# Patient Record
Sex: MALE | Race: BLACK OR AFRICAN AMERICAN | NOT HISPANIC OR LATINO | Employment: OTHER | URBAN - NONMETROPOLITAN AREA
[De-identification: names, ages, dates, MRNs, and addresses within clinical notes are randomized per-mention and may not be internally consistent; named-entity substitution may affect disease eponyms.]

---

## 2021-06-18 ENCOUNTER — HOSPITAL ENCOUNTER (EMERGENCY)
Facility: HOSPITAL | Age: 56
Discharge: HOME | End: 2021-06-18
Attending: EMERGENCY MEDICINE
Payer: MEDICARE

## 2021-06-18 VITALS
SYSTOLIC BLOOD PRESSURE: 201 MMHG | OXYGEN SATURATION: 100 % | BODY MASS INDEX: 18.42 KG/M2 | TEMPERATURE: 98 F | WEIGHT: 139 LBS | RESPIRATION RATE: 14 BRPM | HEART RATE: 95 BPM | HEIGHT: 73 IN | DIASTOLIC BLOOD PRESSURE: 95 MMHG

## 2021-06-18 DIAGNOSIS — M79.89 LEG SWELLING: Primary | ICD-10-CM

## 2021-06-18 DIAGNOSIS — R60.9 SWELLING: ICD-10-CM

## 2021-06-18 PROCEDURE — 25000003 PHARM REV CODE 250: Performed by: EMERGENCY MEDICINE

## 2021-06-18 PROCEDURE — 99284 EMERGENCY DEPT VISIT MOD MDM: CPT | Mod: 25

## 2021-06-18 RX ORDER — AMLODIPINE BESYLATE 10 MG/1
10 TABLET ORAL
Status: COMPLETED | OUTPATIENT
Start: 2021-06-18 | End: 2021-06-18

## 2021-06-18 RX ORDER — HYDROCODONE BITARTRATE AND ACETAMINOPHEN 5; 325 MG/1; MG/1
1 TABLET ORAL EVERY 6 HOURS PRN
Qty: 11 TABLET | Refills: 0 | Status: ON HOLD | OUTPATIENT
Start: 2021-06-18 | End: 2023-07-31 | Stop reason: HOSPADM

## 2021-06-18 RX ORDER — SULFAMETHOXAZOLE AND TRIMETHOPRIM 800; 160 MG/1; MG/1
1 TABLET ORAL 2 TIMES DAILY
Qty: 14 TABLET | Refills: 0 | Status: SHIPPED | OUTPATIENT
Start: 2021-06-18 | End: 2021-06-25

## 2021-06-18 RX ADMIN — AMLODIPINE BESYLATE 10 MG: 10 TABLET ORAL at 07:06

## 2021-06-25 ENCOUNTER — HOSPITAL ENCOUNTER (EMERGENCY)
Facility: HOSPITAL | Age: 56
Discharge: LEFT BEFORE DISCHARGE | End: 2021-06-25
Attending: FAMILY MEDICINE
Payer: MEDICARE

## 2021-06-25 VITALS
OXYGEN SATURATION: 99 % | HEART RATE: 109 BPM | SYSTOLIC BLOOD PRESSURE: 113 MMHG | TEMPERATURE: 98 F | DIASTOLIC BLOOD PRESSURE: 72 MMHG | WEIGHT: 135 LBS | BODY MASS INDEX: 17.32 KG/M2 | HEIGHT: 74 IN | RESPIRATION RATE: 18 BRPM

## 2021-06-25 DIAGNOSIS — R22.41 LOCALIZED SWELLING OF RIGHT LOWER EXTREMITY: ICD-10-CM

## 2021-06-25 DIAGNOSIS — M79.89 LEG SWELLING: Primary | ICD-10-CM

## 2021-06-25 PROCEDURE — 99282 EMERGENCY DEPT VISIT SF MDM: CPT

## 2023-07-29 ENCOUNTER — HOSPITAL ENCOUNTER (EMERGENCY)
Facility: HOSPITAL | Age: 58
Discharge: ANOTHER HOSPITAL | End: 2023-07-30
Attending: STUDENT IN AN ORGANIZED HEALTH CARE EDUCATION/TRAINING PROGRAM
Payer: MEDICARE

## 2023-07-29 DIAGNOSIS — E87.1 HYPONATREMIA: ICD-10-CM
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
## 2023-07-29 DIAGNOSIS — S72.90XA FEMUR FRACTURE: Primary | ICD-10-CM

## 2023-07-29 LAB
ABO + RH BLD: NORMAL
ALBUMIN SERPL BCP-MCNC: 4 G/DL (ref 3.5–5.2)
ALP SERPL-CCNC: 75 U/L (ref 55–135)
ALT SERPL W/O P-5'-P-CCNC: 43 U/L (ref 10–44)
ANION GAP SERPL CALC-SCNC: 7 MMOL/L (ref 8–16)
APTT PPP: 27.8 SEC (ref 21–32)
AST SERPL-CCNC: 39 U/L (ref 10–40)
BASOPHILS # BLD AUTO: 0.06 K/UL (ref 0–0.2)
BASOPHILS NFR BLD: 1.2 % (ref 0–1.9)
BILIRUB SERPL-MCNC: 0.3 MG/DL (ref 0.1–1)
BLD GP AB SCN CELLS X3 SERPL QL: NORMAL
BUN SERPL-MCNC: 9 MG/DL (ref 6–20)
CALCIUM SERPL-MCNC: 8.6 MG/DL (ref 8.7–10.5)
CHLORIDE SERPL-SCNC: 87 MMOL/L (ref 95–110)
CO2 SERPL-SCNC: 29 MMOL/L (ref 23–29)
CREAT SERPL-MCNC: 0.8 MG/DL (ref 0.5–1.4)
DIFFERENTIAL METHOD: ABNORMAL
EOSINOPHIL # BLD AUTO: 0.2 K/UL (ref 0–0.5)
EOSINOPHIL NFR BLD: 3.3 % (ref 0–8)
ERYTHROCYTE [DISTWIDTH] IN BLOOD BY AUTOMATED COUNT: 13.1 % (ref 11.5–14.5)
EST. GFR  (NO RACE VARIABLE): >60 ML/MIN/1.73 M^2
GLUCOSE SERPL-MCNC: 95 MG/DL (ref 70–110)
HCT VFR BLD AUTO: 35.5 % (ref 40–54)
HGB BLD-MCNC: 12.7 G/DL (ref 14–18)
IMM GRANULOCYTES # BLD AUTO: 0.01 K/UL (ref 0–0.04)
IMM GRANULOCYTES NFR BLD AUTO: 0.2 % (ref 0–0.5)
INR PPP: 1 (ref 0.8–1.2)
LYMPHOCYTES # BLD AUTO: 2.4 K/UL (ref 1–4.8)
LYMPHOCYTES NFR BLD: 49 % (ref 18–48)
MCH RBC QN AUTO: 32.6 PG (ref 27–31)
MCHC RBC AUTO-ENTMCNC: 35.8 G/DL (ref 32–36)
MCV RBC AUTO: 91 FL (ref 82–98)
MONOCYTES # BLD AUTO: 0.5 K/UL (ref 0.3–1)
MONOCYTES NFR BLD: 10.4 % (ref 4–15)
NEUTROPHILS # BLD AUTO: 1.8 K/UL (ref 1.8–7.7)
NEUTROPHILS NFR BLD: 35.9 % (ref 38–73)
NRBC BLD-RTO: 0 /100 WBC
PLATELET # BLD AUTO: 191 K/UL (ref 150–450)
PMV BLD AUTO: 9.2 FL (ref 9.2–12.9)
POTASSIUM SERPL-SCNC: 3.1 MMOL/L (ref 3.5–5.1)
PROT SERPL-MCNC: 7.7 G/DL (ref 6–8.4)
PROTHROMBIN TIME: 10.1 SEC (ref 9–12.5)
RBC # BLD AUTO: 3.89 M/UL (ref 4.6–6.2)
SODIUM SERPL-SCNC: 123 MMOL/L (ref 136–145)
SPECIMEN OUTDATE: NORMAL
WBC # BLD AUTO: 4.92 K/UL (ref 3.9–12.7)

## 2023-07-29 PROCEDURE — 80053 COMPREHEN METABOLIC PANEL: CPT | Performed by: STUDENT IN AN ORGANIZED HEALTH CARE EDUCATION/TRAINING PROGRAM

## 2023-07-29 PROCEDURE — 85610 PROTHROMBIN TIME: CPT | Performed by: STUDENT IN AN ORGANIZED HEALTH CARE EDUCATION/TRAINING PROGRAM

## 2023-07-29 PROCEDURE — 86900 BLOOD TYPING SEROLOGIC ABO: CPT | Performed by: STUDENT IN AN ORGANIZED HEALTH CARE EDUCATION/TRAINING PROGRAM

## 2023-07-29 PROCEDURE — 85730 THROMBOPLASTIN TIME PARTIAL: CPT | Performed by: STUDENT IN AN ORGANIZED HEALTH CARE EDUCATION/TRAINING PROGRAM

## 2023-07-29 PROCEDURE — 85025 COMPLETE CBC W/AUTO DIFF WBC: CPT | Performed by: STUDENT IN AN ORGANIZED HEALTH CARE EDUCATION/TRAINING PROGRAM

## 2023-07-29 PROCEDURE — 99285 EMERGENCY DEPT VISIT HI MDM: CPT | Mod: 25

## 2023-07-29 PROCEDURE — 93010 EKG 12-LEAD: ICD-10-PCS | Mod: ,,, | Performed by: INTERNAL MEDICINE

## 2023-07-29 PROCEDURE — 36415 COLL VENOUS BLD VENIPUNCTURE: CPT | Performed by: STUDENT IN AN ORGANIZED HEALTH CARE EDUCATION/TRAINING PROGRAM

## 2023-07-29 PROCEDURE — 96374 THER/PROPH/DIAG INJ IV PUSH: CPT

## 2023-07-29 PROCEDURE — 93010 ELECTROCARDIOGRAM REPORT: CPT | Mod: ,,, | Performed by: INTERNAL MEDICINE

## 2023-07-29 PROCEDURE — 93005 ELECTROCARDIOGRAM TRACING: CPT

## 2023-07-29 RX ORDER — HYDROMORPHONE HYDROCHLORIDE 1 MG/ML
1 INJECTION, SOLUTION INTRAMUSCULAR; INTRAVENOUS; SUBCUTANEOUS
Status: COMPLETED | OUTPATIENT
Start: 2023-07-30 | End: 2023-07-29

## 2023-07-29 RX ADMIN — HYDROMORPHONE HYDROCHLORIDE 1 MG: 1 INJECTION, SOLUTION INTRAMUSCULAR; INTRAVENOUS; SUBCUTANEOUS at 11:07

## 2023-07-30 VITALS
RESPIRATION RATE: 18 BRPM | TEMPERATURE: 98 F | HEART RATE: 74 BPM | SYSTOLIC BLOOD PRESSURE: 169 MMHG | HEIGHT: 74 IN | BODY MASS INDEX: 17.97 KG/M2 | DIASTOLIC BLOOD PRESSURE: 82 MMHG | OXYGEN SATURATION: 98 % | WEIGHT: 140 LBS

## 2023-07-30 PROBLEM — E87.1 HYPONATREMIA: Status: ACTIVE | Noted: 2023-07-30

## 2023-07-30 PROBLEM — S72.402A CLOSED FRACTURE OF DISTAL END OF LEFT FEMUR: Status: RESOLVED | Noted: 2023-07-30 | Resolved: 2023-07-30

## 2023-07-30 PROBLEM — I10 HTN (HYPERTENSION): Status: ACTIVE | Noted: 2023-07-30

## 2023-07-30 PROBLEM — S72.402A CLOSED FRACTURE OF DISTAL END OF LEFT FEMUR: Status: ACTIVE | Noted: 2023-07-30

## 2023-07-30 PROBLEM — S72.401A CLOSED FRACTURE OF RIGHT DISTAL FEMUR: Status: ACTIVE | Noted: 2023-07-30

## 2023-07-30 PROBLEM — F10.10 ALCOHOL ABUSE, DAILY USE: Status: ACTIVE | Noted: 2023-07-30

## 2023-07-30 LAB
BACTERIA #/AREA URNS HPF: NEGATIVE /HPF
BILIRUB UR QL STRIP: NEGATIVE
CLARITY UR: CLEAR
COLOR UR: YELLOW
CREAT UR-MCNC: 71.7 MG/DL (ref 23–375)
GLUCOSE UR QL STRIP: NEGATIVE
HGB UR QL STRIP: ABNORMAL
HYALINE CASTS #/AREA URNS LPF: 0 /LPF
KETONES UR QL STRIP: NEGATIVE
LEUKOCYTE ESTERASE UR QL STRIP: NEGATIVE
MICROSCOPIC COMMENT: ABNORMAL
NITRITE UR QL STRIP: NEGATIVE
OSMOLALITY UR: 355 MOSM/KG (ref 50–1200)
PH UR STRIP: 7 [PH] (ref 5–8)
POTASSIUM UR-SCNC: 33 MMOL/L (ref 15–95)
PROT UR QL STRIP: NEGATIVE
RBC #/AREA URNS HPF: 7 /HPF (ref 0–4)
SODIUM UR-SCNC: 48 MMOL/L (ref 20–250)
SP GR UR STRIP: 1.01 (ref 1–1.03)
SQUAMOUS #/AREA URNS HPF: 0 /HPF
URN SPEC COLLECT METH UR: ABNORMAL
UROBILINOGEN UR STRIP-ACNC: NEGATIVE EU/DL
WBC #/AREA URNS HPF: 0 /HPF (ref 0–5)

## 2023-07-30 PROCEDURE — 82570 ASSAY OF URINE CREATININE: CPT | Performed by: STUDENT IN AN ORGANIZED HEALTH CARE EDUCATION/TRAINING PROGRAM

## 2023-07-30 PROCEDURE — 83935 ASSAY OF URINE OSMOLALITY: CPT | Performed by: STUDENT IN AN ORGANIZED HEALTH CARE EDUCATION/TRAINING PROGRAM

## 2023-07-30 PROCEDURE — 84133 ASSAY OF URINE POTASSIUM: CPT | Performed by: STUDENT IN AN ORGANIZED HEALTH CARE EDUCATION/TRAINING PROGRAM

## 2023-07-30 PROCEDURE — 63600175 PHARM REV CODE 636 W HCPCS: Performed by: STUDENT IN AN ORGANIZED HEALTH CARE EDUCATION/TRAINING PROGRAM

## 2023-07-30 PROCEDURE — 84300 ASSAY OF URINE SODIUM: CPT | Performed by: STUDENT IN AN ORGANIZED HEALTH CARE EDUCATION/TRAINING PROGRAM

## 2023-07-30 PROCEDURE — 81000 URINALYSIS NONAUTO W/SCOPE: CPT | Performed by: STUDENT IN AN ORGANIZED HEALTH CARE EDUCATION/TRAINING PROGRAM

## 2023-07-30 NOTE — ED PROVIDER NOTES
"  History  Chief Complaint   Patient presents with    Knee Injury     Patient presents to ED via EMS c/o right knee pain. Patient tripped over fan and "felt pop". Obvious swelling on assessment.     58-YEAR-OLD MALE PRESENTS FOR EVALUATION STATUS POST A TRIP AND FALL.  PATIENT HAD MECHANICAL TRIP AND FALL NOW WITH RIGHT PAIN AND SWELLING.  NO HEAD TRAUMA REPORTED.  NO LOSS OF CONSCIOUSNESS REPORTED.  ALL OTHER SYSTEMS REVIEWED ARE NEGATIVE.        Past Medical History:   Diagnosis Date    Hypertension        No past surgical history on file.    No family history on file.    Social History     Tobacco Use    Smoking status: Every Day     Current packs/day: 1.00     Average packs/day: 1 pack/day for 20.0 years (20.0 ttl pk-yrs)     Types: Cigarettes    Smokeless tobacco: Never   Substance Use Topics    Alcohol use: Yes     Comment: every other day beer    Drug use: No       ROS  Review of Systems   Musculoskeletal:  Positive for arthralgias and joint swelling.       Physical Exam  BP (!) 169/82   Pulse 74   Temp 98.2 °F (36.8 °C)   Resp 18   Ht 6' 2" (1.88 m)   Wt 63.5 kg (140 lb)   SpO2 98%   BMI 17.97 kg/m²   Physical Exam    Constitutional: He appears well-developed and well-nourished. He is cooperative.   HENT:   Head: Normocephalic and atraumatic.   Eyes: Conjunctivae, EOM and lids are normal. Pupils are equal, round, and reactive to light.   Neck: Phonation normal.   Normal range of motion.  Cardiovascular:  Normal rate, regular rhythm and intact distal pulses.           Pulmonary/Chest: Breath sounds normal. No stridor. No respiratory distress.   Abdominal: Abdomen is soft. There is no abdominal tenderness.   Musculoskeletal:      Cervical back: Normal range of motion.      Right knee: Swelling, effusion and bony tenderness present.     Neurological: He is alert and oriented to person, place, and time.   Skin: Skin is warm and dry.   Psychiatric: He has a normal mood and affect. His speech is normal and " behavior is normal.               Labs Reviewed   CBC W/ AUTO DIFFERENTIAL - Abnormal; Notable for the following components:       Result Value    RBC 3.89 (*)     Hemoglobin 12.7 (*)     Hematocrit 35.5 (*)     MCH 32.6 (*)     Gran % 35.9 (*)     Lymph % 49.0 (*)     All other components within normal limits   COMPREHENSIVE METABOLIC PANEL - Abnormal; Notable for the following components:    Sodium 123 (*)     Potassium 3.1 (*)     Chloride 87 (*)     Calcium 8.6 (*)     Anion Gap 7 (*)     All other components within normal limits   URINALYSIS, REFLEX TO URINE CULTURE - Abnormal; Notable for the following components:    Occult Blood UA 1+ (*)     All other components within normal limits    Narrative:     Preferred Collection Type->Urine, Clean Catch  Specimen Source->Urine   URINALYSIS MICROSCOPIC - Abnormal; Notable for the following components:    RBC, UA 7 (*)     Hyaline Casts, UA 0.0 (*)     All other components within normal limits    Narrative:     Preferred Collection Type->Urine, Clean Catch  Specimen Source->Urine   APTT   PROTIME-INR   SODIUM, URINE, RANDOM    Narrative:     Preferred Collection Type->Urine, Clean Catch  Specimen Source->Urine   CREATININE, URINE, RANDOM    Narrative:     Preferred Collection Type->Urine, Clean Catch  Specimen Source->Urine   POTASSIUM, URINE, RANDOM    Narrative:     Preferred Collection Type->Urine, Clean Catch  Specimen Source->Urine   OSMOLALITY, URINE RANDOM    Narrative:     Preferred Collection Type->Urine, Clean Catch  Specimen Source->Urine   TYPE & SCREEN        Type of Interpretation: ED Physician (Independently Interpreted).  Radiology Procedure Done: Right Femur.  Interpretation: DISTAL FEMUR FX                      Procedures             Medical Decision Making  58-YEAR-OLD MALE PRESENTS FOR EVALUATION STATUS POST A TRIP AND FALL.  PATIENT HAD MECHANICAL TRIP AND FALL NOW WITH RIGHT PAIN AND SWELLING.  NO HEAD TRAUMA REPORTED.  NO LOSS OF CONSCIOUSNESS  REPORTED.  ALL OTHER SYSTEMS REVIEWED ARE NEGATIVE.  X-RAY NOTABLE FOR DISTAL FEMUR FRACTURE.  PREOP LABS OBTAINED AND [facility] PLACED FOR TRANSFER.    Amount and/or Complexity of Data Reviewed  Labs: ordered. Decision-making details documented in ED Course.  Radiology: ordered.    Risk  Prescription drug management.               ED Course as of 07/31/23 0236   Sat Jul 29, 2023   2327 Pt accepted to [facility] [NA]   2327 Sodium(!): 123 [NA]      ED Course User Index  [NA] Andreina Andrews MD       Clinical Impression  The primary encounter diagnosis was Femur fracture. Diagnoses of Fall and Hyponatremia were also pertinent to this visit.       Andreina Andrews MD  07/31/23 0236

## 2023-07-31 PROBLEM — I10 HTN (HYPERTENSION): Chronic | Status: ACTIVE | Noted: 2023-07-30

## 2023-08-08 ENCOUNTER — HOSPITAL ENCOUNTER (OUTPATIENT)
Dept: RADIOLOGY | Facility: HOSPITAL | Age: 58
Discharge: HOME | End: 2023-08-08
Payer: MEDICARE

## 2023-08-08 DIAGNOSIS — H53.40 VISUAL FIELD DEFECT: ICD-10-CM

## 2023-08-08 DIAGNOSIS — S09.90XA HEAD TRAUMA, INITIAL ENCOUNTER: ICD-10-CM

## 2023-08-08 PROBLEM — R47.02 DYSPHASIA: Status: ACTIVE | Noted: 2023-08-08

## 2023-08-08 PROCEDURE — 70450 CT HEAD/BRAIN W/O DYE: CPT | Mod: TC

## 2023-08-14 ENCOUNTER — HOSPITAL ENCOUNTER (OUTPATIENT)
Dept: RADIOLOGY | Facility: HOSPITAL | Age: 58
Discharge: HOME | End: 2023-08-14
Payer: MEDICARE

## 2023-08-14 DIAGNOSIS — I63.89 OTHER CEREBRAL INFARCTION: ICD-10-CM

## 2023-08-14 DIAGNOSIS — I63.9 CEREBROVASCULAR ACCIDENT (CVA), UNSPECIFIED MECHANISM: ICD-10-CM

## 2023-08-14 PROCEDURE — 70551 MRI BRAIN STEM W/O DYE: CPT | Mod: TC

## 2023-08-14 PROCEDURE — 70544 MR ANGIOGRAPHY HEAD W/O DYE: CPT | Mod: TC

## 2023-08-16 NOTE — PROGRESS NOTES
Please let patient's sister Nadia know MRI does confirm stroke.  Please stress importance of keeping scheduled follow-up 8/21 to further discuss.

## 2023-08-23 ENCOUNTER — HOSPITAL ENCOUNTER (OUTPATIENT)
Dept: RADIOLOGY | Facility: HOSPITAL | Age: 58
Discharge: HOME | End: 2023-08-23
Payer: MEDICARE

## 2023-08-23 DIAGNOSIS — I63.9 CEREBROVASCULAR ACCIDENT (CVA), UNSPECIFIED MECHANISM: ICD-10-CM

## 2023-08-23 DIAGNOSIS — I63.89 OTHER CEREBRAL INFARCTION: ICD-10-CM

## 2023-08-23 PROCEDURE — 93880 EXTRACRANIAL BILAT STUDY: CPT | Mod: TC

## 2023-08-23 NOTE — PROGRESS NOTES
Please let patient know US was normal.  Please stress to patient he needs to schedule follow-up appointment with us to further discuss treatment.

## 2023-09-25 PROBLEM — R63.0 DECREASED APPETITE: Status: ACTIVE | Noted: 2023-09-25

## 2023-09-25 PROBLEM — F32.A DEPRESSION: Status: ACTIVE | Noted: 2023-09-25

## 2023-09-25 PROBLEM — Z86.73 HISTORY OF TRANSIENT ISCHEMIC ATTACK (TIA): Status: ACTIVE | Noted: 2023-09-25
